# Patient Record
Sex: FEMALE | Race: BLACK OR AFRICAN AMERICAN | Employment: FULL TIME | URBAN - METROPOLITAN AREA
[De-identification: names, ages, dates, MRNs, and addresses within clinical notes are randomized per-mention and may not be internally consistent; named-entity substitution may affect disease eponyms.]

---

## 2021-01-01 ENCOUNTER — HOSPITAL ENCOUNTER (OUTPATIENT)
Dept: NUTRITION | Age: 51
Discharge: HOME | End: 2021-05-03
Payer: COMMERCIAL

## 2021-01-01 PROCEDURE — 74750000195

## 2021-05-03 NOTE — PROGRESS NOTES
..  [address] [place] [address]. [facility] [address] [place] [address]   Nutrition Assessment  Medical Nutrition Therapy   Outpatient Initial Evaluation         Patient Name: Edwardo Kim : 1970   Treatment Diagnosis: Hypoalbuminemia; CKD   Referral Source: Judd Batista MD [facility]): 5/3/2021     Gender: female Age: 45 y.o. Ht: 66 in Wt: 203  lb  kg   BMI: 32.28 BMR   Male  BMR Female 1560     Past Medical History:  Breast CA, HTN, type 1 DM (on an insulin pump), anemia from CKD, hx of PE, hypothyroidism, DVT     Pertinent Medications:   Novolog in pump (do not have access to pump settings), Lipitor, blood pressure medicines, Synthroid, Eliquis     Biochemical Data:   21: A1C 5.9, chol 262, , HDL 48, , P 5.4       Assessment:    The patient is here today because she was told her albumin was too low and she was told to eat more protein but she was not sure how much protein to eat. She is confused because she had seen a nephrologist and was told to avoid eating meat completely to keep her kidney disease from advancing. She said she stopped eating meat and lost some weight (approximately 6# in 2-3 weeks) but she has noticed her blood sugars are higher now. She said she was eating more fruits (instead of meat). The patient's endocrinologist is Dr. Leann Chun. The patient moved here from [place] about a year ago. She says her blood sugars are in much better control now that she is on an insulin pump. She is concerned about her kidney disease, however. She does not know if she will be going on dialysis soon. \"No one has told me anything. \"  * Note: the patient opted to self pay for this appointment. Her diagnosis for this visit was not covered by her insurance plan. *       Food & Nutrition: \"I have no idea what to eat. \"  Breakfast: one sausage link, 2 egg whites, 1 c berries, crystal light  Lunch: skips  Dinner: steak or chicken on the grill, a little rice, salad (scared to eat it because of Eliquis medicine)         Estimate Needs   Calories: 1600  Protein: 75-  95 Carbs: Fat:    Kcal/day  g/day  g/day  g/day        percent: .8-1 g per kg   45  30               Nutrition Diagnosis   Altered nutrition related lab values related to advancing CKD as evidenced by GFR 19.3, elevated Phosphorus in blood (5.4). A1C much improved 5.9%  now (was 10%)        Nutrition Intervention &  Education: Educated patient on healthy diet and preventing worsening kidney disease. Encouraged the patient to eat 3 balanced meals per day including reasonable amount of protein .8-1.0 g/kg body weight (75-95 g per day), complex carbohydrates and non-starchy vegetables. Follow guidelines for sodium restriction (buy fresh or frozen vegetables, avoid processed foods, don't use salt at the table). Follow nephrologist guidelines for K and P (avoid diet quang and dairy products). Limit fatty, fried foods. Continue to eat plenty of fiber (fruits and vegetables and whole grains) to help lower cholesterol. The patient may add a snack with 1 protein and 1 carb or drink  1 protein supplements per day (could try Premier protein shake- vanilla flavor). Drink 64 oz water daily. Exercise daily. May add strength training exercises to build muscle mass. Education materials and RD contact info given. The patient may contact RD with questions and concerns.       Handouts Provided: [x]  Carbohydrates  [x]  Protein  [x]  Non-starchy Vegetables  []  Food Label  [x]  Meal and Snack Ideas  []  Food Journals [x]  Diabetes  []  Cholesterol  [x]  Sodium  [x]  Gen Nutr Guidelines  []  SBGM Guidelines  [x]  Others: K, P, Na (kidney) guidelines   Information Reviewed with: Patient and patient's daughter    Readiness to Change Stage: []  Pre-contemplative    []  Contemplative  [x]  Preparation               []  Action                  []  Maintenance   Potential Barriers to Learning: []  Decline in memory    []  Language barrier   [x]  Other: Patient is getting conflicting guidelines from various doctors and she is new to the area; pt needs to follow advice of nephrologist at this time. If patient needs dialysis she will see renal RD at the clinic. Advice/restrictions will be based on lab work. []  Emotional                  []  Limited mobility  []  Lack of motivation     [] Vision, hearing or cognitive impairment   Expected Compliance: Fair     Nutritional Goal - To promote lifestyle changes to result in:    []  Weight loss  []  Improved diabetic control  []  Decreased cholesterol levels  []  Decreased blood pressure  []  Weight maintenance []  Preventing any interactions associated with food allergies  []  Adequate weight gain toward goal weight  [x]  Other: improve protein status; control advancing  CKD if able at this time; patient may need dialysis soon        Patient Goals:   1. Include 1 piece of lean meat (3 oz)  at lunch and dinner (consume reasonable amount of protein in diet) 3 oz = 21 g protein   2. Eat only two servings of fruit per day (preferrably not high K ones)  3. Watch seasonings: do not use sea salt at all in cooking. Use fresh or dried herbs and spices, Mrs. Latrell, fresh garlic   4. Try to stick to no more than 45 g carbs at meals (watch portion sizes)   5.  May try Premier protein shake (only one per day- vanilla flavor) if need to increase daily protein for that particular day      Dietitian Signature: Paulina Wylie RD,[facility] Date: 5/3/2021   Follow-up: PRN Time: 5:11 PM